# Patient Record
Sex: MALE | Race: OTHER | NOT HISPANIC OR LATINO | URBAN - METROPOLITAN AREA
[De-identification: names, ages, dates, MRNs, and addresses within clinical notes are randomized per-mention and may not be internally consistent; named-entity substitution may affect disease eponyms.]

---

## 2018-06-22 ENCOUNTER — EMERGENCY (EMERGENCY)
Facility: HOSPITAL | Age: 28
LOS: 1 days | Discharge: HOME | End: 2018-06-22
Attending: EMERGENCY MEDICINE
Payer: COMMERCIAL

## 2018-06-22 VITALS
TEMPERATURE: 98 F | OXYGEN SATURATION: 98 % | SYSTOLIC BLOOD PRESSURE: 128 MMHG | DIASTOLIC BLOOD PRESSURE: 77 MMHG | RESPIRATION RATE: 20 BRPM | HEART RATE: 67 BPM

## 2018-06-22 VITALS — HEIGHT: 70 IN | WEIGHT: 149.91 LBS

## 2018-06-22 PROCEDURE — 99283 EMERGENCY DEPT VISIT LOW MDM: CPT | Mod: 25

## 2018-06-22 PROCEDURE — 12001 RPR S/N/AX/GEN/TRNK 2.5CM/<: CPT

## 2018-06-22 RX ORDER — LIDOCAINE HCL 20 MG/ML
10 VIAL (ML) INJECTION ONCE
Qty: 0 | Refills: 0 | Status: DISCONTINUED | OUTPATIENT
Start: 2018-06-22 | End: 2018-06-27

## 2018-06-22 RX ORDER — IBUPROFEN 200 MG
800 TABLET ORAL ONCE
Qty: 0 | Refills: 0 | Status: COMPLETED | OUTPATIENT
Start: 2018-06-22 | End: 2018-06-22

## 2018-06-22 RX ADMIN — Medication 800 MILLIGRAM(S): at 21:26

## 2018-06-22 NOTE — ED STATDOCS - OBJECTIVE STATEMENT
USOH until about 6 pm when accidentally sustained laceration to ventral left digit #4, approx. 0.5 cm long, horizontal, superficial over middle phalanx.  Tetanus = UTD.  NO numbness, tingling.  IRrigated prior to coming to ED, achieved hemostasis by applying pressure alone.  NO other complaints or injuries.  Patient is right hand dominant.

## 2018-06-22 NOTE — ED STATDOCS - MEDICAL DECISION MAKING DETAILS
laceration to left digit #4  tetanuse UTD, pain meds  simple closure s/p local analgesia with 1% lidocaine

## 2018-06-22 NOTE — ED ADULT NURSE NOTE - CHIEF COMPLAINT
----- Message from Luz Mcgill sent at 2018 11:10 AM CST -----  Contact: [facility] [place] [phone]  Missed [facility] appt this am at 9:45--- Needs to reschedule appt    The patient is a 28y Male complaining of lacerations.

## 2022-01-11 NOTE — ED STATDOCS - PHYSICAL EXAMINATION
F: none, HD  E: HD  N: diabetic and renal diet  Ppx: hep subq  Dispo: F horizontal laceration about 0.5 cm over middle phalanx of left digit #4, volar aspect, radial pulse nl, cap refill nl, FDP/FDS intact, nl grasp